# Patient Record
Sex: MALE | Race: BLACK OR AFRICAN AMERICAN | NOT HISPANIC OR LATINO | Employment: OTHER | URBAN - METROPOLITAN AREA
[De-identification: names, ages, dates, MRNs, and addresses within clinical notes are randomized per-mention and may not be internally consistent; named-entity substitution may affect disease eponyms.]

---

## 2023-10-06 ENCOUNTER — SPECIALTY PHARMACY (OUTPATIENT)
Dept: PHARMACY | Facility: CLINIC | Age: 58
End: 2023-10-06

## 2023-10-06 ENCOUNTER — PHARMACY VISIT (OUTPATIENT)
Dept: PHARMACY | Facility: CLINIC | Age: 58
End: 2023-10-06
Payer: MEDICAID

## 2023-10-06 PROCEDURE — RXMED WILLOW AMBULATORY MEDICATION CHARGE

## 2023-10-06 NOTE — PROGRESS NOTES
Patient address: [address] [place] [id number]  Patient Medications: Lynparza  Medication delivery date: Patient set delivery 10/10 for lynparza. OK with co pay.

## 2023-11-04 ENCOUNTER — SPECIALTY PHARMACY (OUTPATIENT)
Dept: PHARMACY | Facility: CLINIC | Age: 58
End: 2023-11-04

## 2023-11-04 ENCOUNTER — PHARMACY VISIT (OUTPATIENT)
Dept: PHARMACY | Facility: CLINIC | Age: 58
End: 2023-11-04
Payer: MEDICAID

## 2023-11-04 PROCEDURE — RXMED WILLOW AMBULATORY MEDICATION CHARGE

## 2023-11-07 ENCOUNTER — SPECIALTY PHARMACY (OUTPATIENT)
Dept: PHARMACY | Facility: CLINIC | Age: 58
End: 2023-11-07

## 2023-11-21 ENCOUNTER — OFFICE VISIT (OUTPATIENT)
Dept: HEMATOLOGY/ONCOLOGY | Facility: HOSPITAL | Age: 58
End: 2023-11-21
Payer: MEDICAID

## 2023-11-21 ENCOUNTER — APPOINTMENT (OUTPATIENT)
Dept: HEMATOLOGY/ONCOLOGY | Facility: HOSPITAL | Age: 58
End: 2023-11-21

## 2023-11-21 VITALS
TEMPERATURE: 96.6 F | HEART RATE: 106 BPM | WEIGHT: 194.67 LBS | RESPIRATION RATE: 18 BRPM | OXYGEN SATURATION: 100 % | DIASTOLIC BLOOD PRESSURE: 84 MMHG | SYSTOLIC BLOOD PRESSURE: 138 MMHG

## 2023-11-21 DIAGNOSIS — C61 MALIGNANT NEOPLASM OF PROSTATE (MULTI): ICD-10-CM

## 2023-11-21 PROCEDURE — 99215 OFFICE O/P EST HI 40 MIN: CPT | Performed by: STUDENT IN AN ORGANIZED HEALTH CARE EDUCATION/TRAINING PROGRAM

## 2023-11-21 PROCEDURE — 1036F TOBACCO NON-USER: CPT | Performed by: STUDENT IN AN ORGANIZED HEALTH CARE EDUCATION/TRAINING PROGRAM

## 2023-11-21 RX ORDER — RIVAROXABAN 20 MG/1
TABLET, FILM COATED ORAL
COMMUNITY
Start: 2023-10-09

## 2023-11-21 ASSESSMENT — PAIN SCALES - GENERAL: PAINLEVEL: 0-NO PAIN

## 2023-11-21 ASSESSMENT — PATIENT HEALTH QUESTIONNAIRE - PHQ9
1. LITTLE INTEREST OR PLEASURE IN DOING THINGS: NOT AT ALL
SUM OF ALL RESPONSES TO PHQ9 QUESTIONS 1 AND 2: 0
2. FEELING DOWN, DEPRESSED OR HOPELESS: NOT AT ALL

## 2023-11-21 ASSESSMENT — COLUMBIA-SUICIDE SEVERITY RATING SCALE - C-SSRS
1. IN THE PAST MONTH, HAVE YOU WISHED YOU WERE DEAD OR WISHED YOU COULD GO TO SLEEP AND NOT WAKE UP?: NO
2. HAVE YOU ACTUALLY HAD ANY THOUGHTS OF KILLING YOURSELF?: NO
6. HAVE YOU EVER DONE ANYTHING, STARTED TO DO ANYTHING, OR PREPARED TO DO ANYTHING TO END YOUR LIFE?: NO

## 2023-11-21 ASSESSMENT — ENCOUNTER SYMPTOMS: OCCASIONAL FEELINGS OF UNSTEADINESS: 1

## 2023-11-21 NOTE — PROGRESS NOTES
Patient ID: Rocky Chappell is a 58 y.o. male.  Diagnosis: mCRPC BRCA2+  [facility]    HISTORY OF PRESENT ILLNESS:  Oncologic Summary   2019 - prostate cancer. Lost to follow up  12/17/21 - stage 4 disease. Palliative RXT. ADT and docetaxel x 6  04/2022 - enzalutamide  03/2023 - developed CRPC. Resumed docetaxel  8/8/23 - Started olaparib   11/21/23 - still on olaparib with stable PSA     56 yo man known with mCRPC s/p docetaxel and enzalutamide who is progressing on docetaxel rechallenge. here for consideration fo pluvicto.     PSA  11/21/23 - 6.1  8/30/23 - 6.6  8/21/23 - 12.7      Surgical History:  Rocky has no past surgical history on file.    Social History:  Rocky     Family History:  No family history on file.      OBJECTIVE:    VS / Pain:  There were no vitals taken for this visit.  BSA: There is no height or weight on file to calculate BSA.    Performance Status:  ECOG Score: 2- Ambulatory and  capable of all selfcare; unable to carry out work activities.  Up and about > 50% of waking hrs.      Assessment/Plan   I personally saw patient and counselled all visit  on his diagnosis, rome history and coordination of his care.     This is a 58 y.o. male AA man known with mCRPC (BRCA2+) s/p docetaxel and enzalutamide now on olaparib clinically well and persistent PSA response. CPM with local team    Thank you for the opportunity to be involved in the care of Rocky Chappell. Please do not hesitate to reach out with any questions. Thank you.   -------------------------------------------------------------------------------------------------------------------------------  Dae More MD, Msc, FACP  Co-Leader Genitourinary () Disease Team  Director of  Medical Oncology Research Program   [facility]  Associate Professor of Medicine  [facility]  [address], [address]  [place]  [address]  Phone: [phone]  [email or web address]

## 2023-11-21 NOTE — LETTER
November 21, 2023     Patient: Rocky Chappell   YOB: 1965   Date of Visit: 11/21/2023       To Whom It May Concern:    Rocky Chappell was seen in my clinic on 11/21/2023 at 12:00 pm. His daughter was able to bring him to our visit since he requires assistance to get to our site. Please excuse Mr Chappell's daughter for her absence from work on this day to make the appointment.    If you have any questions or concerns, please don't hesitate to call.         Sincerely,         Dae More MD        CC: No Recipients

## 2023-12-06 ENCOUNTER — SPECIALTY PHARMACY (OUTPATIENT)
Dept: PHARMACY | Facility: CLINIC | Age: 58
End: 2023-12-06

## 2023-12-06 PROCEDURE — RXMED WILLOW AMBULATORY MEDICATION CHARGE

## 2023-12-13 ENCOUNTER — PHARMACY VISIT (OUTPATIENT)
Dept: PHARMACY | Facility: CLINIC | Age: 58
End: 2023-12-13
Payer: MEDICAID

## 2024-01-11 ENCOUNTER — SPECIALTY PHARMACY (OUTPATIENT)
Dept: PHARMACY | Facility: CLINIC | Age: 59
End: 2024-01-11

## 2024-01-11 PROCEDURE — RXMED WILLOW AMBULATORY MEDICATION CHARGE

## 2024-01-17 ENCOUNTER — PHARMACY VISIT (OUTPATIENT)
Dept: PHARMACY | Facility: CLINIC | Age: 59
End: 2024-01-17
Payer: MEDICAID

## 2024-01-30 ENCOUNTER — OFFICE VISIT (OUTPATIENT)
Dept: HEMATOLOGY/ONCOLOGY | Facility: HOSPITAL | Age: 59
End: 2024-01-30
Payer: MEDICAID

## 2024-01-30 ENCOUNTER — LAB (OUTPATIENT)
Dept: LAB | Facility: HOSPITAL | Age: 59
End: 2024-01-30
Payer: MEDICAID

## 2024-01-30 VITALS
TEMPERATURE: 95.9 F | BODY MASS INDEX: 30.69 KG/M2 | WEIGHT: 195.5 LBS | DIASTOLIC BLOOD PRESSURE: 85 MMHG | RESPIRATION RATE: 98 BRPM | HEIGHT: 67 IN | HEART RATE: 95 BPM | SYSTOLIC BLOOD PRESSURE: 127 MMHG | OXYGEN SATURATION: 100 %

## 2024-01-30 DIAGNOSIS — C61 MALIGNANT NEOPLASM OF PROSTATE (MULTI): ICD-10-CM

## 2024-01-30 PROCEDURE — 99215 OFFICE O/P EST HI 40 MIN: CPT | Performed by: STUDENT IN AN ORGANIZED HEALTH CARE EDUCATION/TRAINING PROGRAM

## 2024-01-30 PROCEDURE — 1036F TOBACCO NON-USER: CPT | Performed by: STUDENT IN AN ORGANIZED HEALTH CARE EDUCATION/TRAINING PROGRAM

## 2024-01-30 ASSESSMENT — ENCOUNTER SYMPTOMS
OCCASIONAL FEELINGS OF UNSTEADINESS: 1
DEPRESSION: 0
LOSS OF SENSATION IN FEET: 1

## 2024-01-30 ASSESSMENT — PAIN SCALES - GENERAL: PAINLEVEL: 0-NO PAIN

## 2024-01-30 NOTE — PROGRESS NOTES
"Patient ID: Rocky Chappell is a 58 y.o. male.  Diagnosis: mCRPC somatic BRCA2+  Referral: [facility]    HISTORY OF PRESENT ILLNESS:  Oncologic Summary   2019 - prostate cancer. Lost to follow up  12/17/21 - stage 4 disease. Palliative RXT. ADT and docetaxel x 6  04/2022 - enzalutamide  03/2023 - developed CRPC. Resumed docetaxel  8/8/23 - Started olaparib   11/21/23 - still on olaparib with stable PSA   1/30/24 - on olaparib. Rising PSA    58 yo man known with mCRPC s/p docetaxel and enzalutamide who is progressing on docetaxel rechallenge. here for consideration fo pluvicto.     PSA  1/11/24 - 119  12/11/23 - 26.2  11/21/23 - 6.1  8/30/23 - 6.6  8/21/23 - 12.7    Surgical History:  Rocky has no past surgical history on file.    Social History:  Rocky reports that he has never smoked. He has never used smokeless tobacco. He reports that he does not drink alcohol and does not use drugs.    Family History:  No family history on file.      OBJECTIVE:    VS / Pain:  /85 (BP Location: Left arm, Patient Position: Sitting, BP Cuff Size: Large adult)   Pulse 95   Temp 35.5 °C (95.9 °F) (Skin)   Resp (!) 98   Ht (S) 1.695 m (5' 6.73\")   Wt 88.7 kg (195 lb 8 oz)   SpO2 100%   BMI 30.87 kg/m²   BSA: 2.04 meters squared    Performance Status:  ECOG Score: 2- Ambulatory and  capable of all selfcare; unable to carry out work activities.  Up and about > 50% of waking hrs.    Assessment/Plan   I personally saw patient and counselled all visit  on his diagnosis, rome history and coordination of his care.   This is a 58 y.o. male AA man known with mCRPC (elena BRCA2+) s/p docetaxel and enzalutamide now on olaparib clinically now with PSA progression. Will get him PSMA PET scan to assess if progression and if pluvicto would be option based on PSMA+ vs possible PSMA neg lesions. Also G360 today   Thank you for the opportunity to be involved in the care of Rocky Chappell. Please do not hesitate to reach out with any " questions. Thank you.   -------------------------------------------------------------------------------------------------------------------------------  Dae More MD, Msc, FACP  Co-Leader Genitourinary () Disease Team  Director of  Medical Oncology Research Program   [facility]  Associate Professor of Medicine  [address], [address]  [place] [address]  Phone: [phone]  [email or web address]

## 2024-01-31 ENCOUNTER — APPOINTMENT (OUTPATIENT)
Dept: RADIOLOGY | Facility: HOSPITAL | Age: 59
End: 2024-01-31
Payer: MEDICAID

## 2024-02-01 ENCOUNTER — HOSPITAL ENCOUNTER (OUTPATIENT)
Dept: RADIOLOGY | Facility: HOSPITAL | Age: 59
Discharge: HOME | End: 2024-02-01
Payer: MEDICAID

## 2024-02-01 DIAGNOSIS — C61 MALIGNANT NEOPLASM OF PROSTATE (MULTI): ICD-10-CM

## 2024-02-01 PROCEDURE — 78815 PET IMAGE W/CT SKULL-THIGH: CPT | Mod: PET TUMOR SUBSQ TX STRATEGY | Performed by: STUDENT IN AN ORGANIZED HEALTH CARE EDUCATION/TRAINING PROGRAM

## 2024-02-01 PROCEDURE — A9800 HC RX 343 DIAGNOSTIC RADIOPHARMACEUTICALS: HCPCS | Mod: SE | Performed by: STUDENT IN AN ORGANIZED HEALTH CARE EDUCATION/TRAINING PROGRAM

## 2024-02-01 PROCEDURE — 78815 PET IMAGE W/CT SKULL-THIGH: CPT | Mod: PS

## 2024-02-01 PROCEDURE — 3430000001 HC RX 343 DIAGNOSTIC RADIOPHARMACEUTICALS: Mod: SE | Performed by: STUDENT IN AN ORGANIZED HEALTH CARE EDUCATION/TRAINING PROGRAM

## 2024-02-01 RX ADMIN — KIT FOR THE PREPARATION OF GALLIUM GA 68 GOZETOTIDE 5.58 MILLICURIE: 25 INJECTION, POWDER, LYOPHILIZED, FOR SOLUTION INTRAVENOUS at 10:09

## 2024-02-09 RX ORDER — OLAPARIB 150 MG/1
300 TABLET, FILM COATED ORAL 2 TIMES DAILY
Qty: 120 TABLET | Refills: 5 | Status: CANCELLED | OUTPATIENT
Start: 2024-02-09 | End: 2025-02-08

## 2024-02-12 NOTE — PROGRESS NOTES
Patient ID: Rocky Chappell is a 58 y.o. male.  Diagnosis: mCRPC somatic BRCA2+  Referral: [facility]    HISTORY OF PRESENT ILLNESS:  Oncologic Summary   2019 - prostate cancer. Lost to follow up  12/17/21 - stage 4 disease. Palliative RXT. ADT and docetaxel x 6  04/2022 - enzalutamide  03/2023 - developed CRPC. Resumed docetaxel  8/8/23 - Started olaparib   11/21/23 - still on olaparib with stable PSA   1/30/24 - on olaparib. Rising PSA  2/12/24 - PD in PSMA PET.     56 yo man known with mCRPC s/p docetaxel and enzalutamide who is progressing on docetaxel rechallenge. here for consideration fo pluvicto.     PSA  1/11/24 - 119  12/11/23 - 26.2  11/21/23 - 6.1  8/30/23 - 6.6  8/21/23 - 12.7    Surgical History:  Rocky has no past surgical history on file.    Social History:  Rocky reports that he has never smoked. He has never used smokeless tobacco. He reports that he does not drink alcohol and does not use drugs.    Family History:  No family history on file.    OBJECTIVE:    VS / Pain:  There were no vitals taken for this visit.  BSA: There is no height or weight on file to calculate BSA.    Performance Status:  ECOG Score: 2- Ambulatory and  capable of all selfcare; unable to carry out work activities.  Up and about > 50% of waking hrs.    Assessment/Plan   I personally saw patient and counselled all visit  on his diagnosis, rome history and coordination of his care.   This is a 58 y.o. male AA man known with mCRPC (elena BRCA2+) s/p docetaxel and enzalutamide now on olaparib clinically now with PSA progression. PSMA confirms extensive disease with visceral mets. In such cases, data suggests pluvicto has more limited activity and PSMA expression seems to be heterogenous. I would favor chemo with platinum-based regimen since he has HRR+ disease. I would consider Carbo-cabazi would be my choice based on phase 2 rand data (Alonso et al, Lancet 2019).   Also G360 pending. Will likely have trials for him  afterwards  Thank you for the opportunity to be involved in the care of Rocky Chappell. Please do not hesitate to reach out with any questions. Thank you.   -------------------------------------------------------------------------------------------------------------------------------  Dae More MD, Msc, FACP  Co-Leader Genitourinary () Disease Team  Director of  Medical Oncology Research Program   [facility]  Associate Professor of Medicine  [address] [address]  [place] [address]  Phone: [phone]  [email or web address]

## 2024-02-13 ENCOUNTER — TELEMEDICINE (OUTPATIENT)
Dept: HEMATOLOGY/ONCOLOGY | Facility: HOSPITAL | Age: 59
End: 2024-02-13
Payer: MEDICAID

## 2024-02-13 DIAGNOSIS — C61 MALIGNANT NEOPLASM OF PROSTATE (MULTI): Primary | ICD-10-CM

## 2024-02-13 PROCEDURE — 99214 OFFICE O/P EST MOD 30 MIN: CPT | Performed by: STUDENT IN AN ORGANIZED HEALTH CARE EDUCATION/TRAINING PROGRAM

## 2024-02-13 PROCEDURE — 1036F TOBACCO NON-USER: CPT | Performed by: STUDENT IN AN ORGANIZED HEALTH CARE EDUCATION/TRAINING PROGRAM

## 2024-02-15 ENCOUNTER — SPECIALTY PHARMACY (OUTPATIENT)
Dept: PHARMACY | Facility: CLINIC | Age: 59
End: 2024-02-15

## 2024-02-15 RX ORDER — OLAPARIB 150 MG/1
TABLET, FILM COATED ORAL
Qty: 240 TABLET | Refills: 5 | Status: CANCELLED | OUTPATIENT
Start: 2024-02-15 | End: 2024-08-17

## 2024-02-22 PROCEDURE — RXMED WILLOW AMBULATORY MEDICATION CHARGE

## 2024-02-26 ENCOUNTER — PHARMACY VISIT (OUTPATIENT)
Dept: PHARMACY | Facility: CLINIC | Age: 59
End: 2024-02-26
Payer: MEDICAID

## 2024-02-27 ENCOUNTER — SPECIALTY PHARMACY (OUTPATIENT)
Dept: PHARMACY | Facility: CLINIC | Age: 59
End: 2024-02-27

## 2024-02-27 NOTE — PROGRESS NOTES
[facility] Clinical Note    Rocky Chappell is a 58 y.o. male, who is on the specialty pharmacy service of: [facility].  Rocky Chappell is taking: Lynparza.     Rocky was contacted on 2/27/2024.    Refer to the encounter summary report for documentation details about patient counseling and education.      Impression/Plan  Is patient high risk? (potential patients:  pregnancy, geriatric, pediatric)   No  Is laboratory follow-up needed? No  Is a clinical intervention needed?  Lynparza discontinued than reordered for not being effective. Reaching out to provider contacts to clarify.  Next assessment date?  3 months  Additional comments:    Medication Adherence  The importance of adherence was discussed with the patient and they were advised to take the medication as prescribed by their provider. Rocky was encouraged to call his physician's office if they have a question regarding a missed dose.     Conclusion  Rate your quality of life on scale of 1-10: 8  Rate your satisfaction with  Specialty Pharmacy on scale of 1-10: 10 - Completely satisfied      Patient advised to contact the pharmacy if there are any changes to her medication list, including prescriptions, OTC medications, herbal products, or supplements. Patient was advised of [facility]’s dispensing process, refill timeline, contact information ([phone]), and patient management follow up. Patient confirmed understanding of education conducted during assessment. All patient questions and concerns were addressed to the best of my ability. Patient was encouraged to contact the specialty pharmacy with any questions or concerns.    Confirmed follow-up outreaches are properly scheduled. Reviewed goals of therapy in the program targets.    Demetrio Salguero, PharmD

## 2024-03-19 ENCOUNTER — APPOINTMENT (OUTPATIENT)
Dept: HEMATOLOGY/ONCOLOGY | Facility: HOSPITAL | Age: 59
End: 2024-03-19
Payer: MEDICAID

## 2024-03-22 LAB
LAB MOLECULAR CA TECHNICAL NOTES: NORMAL
SCAN RESULT: NORMAL

## 2024-03-26 ENCOUNTER — SPECIALTY PHARMACY (OUTPATIENT)
Dept: PHARMACY | Facility: CLINIC | Age: 59
End: 2024-03-26

## 2024-05-14 ENCOUNTER — TELEMEDICINE (OUTPATIENT)
Dept: HEMATOLOGY/ONCOLOGY | Facility: HOSPITAL | Age: 59
End: 2024-05-14
Payer: MEDICARE

## 2024-05-14 DIAGNOSIS — C61 MALIGNANT NEOPLASM OF PROSTATE (MULTI): ICD-10-CM

## 2024-05-14 PROCEDURE — 99214 OFFICE O/P EST MOD 30 MIN: CPT | Performed by: STUDENT IN AN ORGANIZED HEALTH CARE EDUCATION/TRAINING PROGRAM

## 2024-05-14 NOTE — PROGRESS NOTES
Patient ID: Rocky Chappell is a 58 y.o. male.  Diagnosis: mCRPC somatic BRCA2+  Referral: [facility]    HISTORY OF PRESENT ILLNESS:  Oncologic Summary   2019 - prostate cancer. Lost to follow up  12/17/21 - stage 4 disease. Palliative RXT. ADT and docetaxel x 6  04/2022 - enzalutamide  03/2023 - developed CRPC. Resumed docetaxel  8/8/23 - Started olaparib   11/21/23 - still on olaparib with stable PSA   1/30/24 - on olaparib. Rising PSA  2/12/24 - PD in PSMA PET.   03/2024 - carbo/cabazi done locally  05/2024 - completed 3 cycles thus far  --  PSA  5/2/24 - 331  1/11/24 - 119  12/11/23 - 26.2  11/21/23 - 6.1  8/30/23 - 6.6  8/21/23 - 12.7    Surgical History:  Rocky has no past surgical history on file.    Social History:  Rocky reports that he has never smoked. He has never used smokeless tobacco. He reports that he does not drink alcohol and does not use drugs.    Family History:  No family history on file.    OBJECTIVE:    VS / Pain:  There were no vitals taken for this visit.  BSA: There is no height or weight on file to calculate BSA.    Performance Status:  ECOG Score: 2- Ambulatory and  capable of all selfcare; unable to carry out work activities.  Up and about > 50% of waking hrs.    Assessment/Plan   I personally saw patient and counselled all visit  on his diagnosis, rome history and coordination of his care.   This is a 58 y.o. male AA man known with mCRPC (elena BRCA2+) s/p docetaxel and enzalutamide now on olaparib clinically now with PSA progression. PSMA confirms extensive disease with visceral mets. In such cases, data suggests pluvicto has more limited activity and PSMA expression seems to be heterogenous. I would favor chemo with platinum-based regimen since he has HRR+ disease. He has been on carbo/cabazi 3 cycles with tumor control with dose reduction s/t AEs. Will continue on chemo and we will chat in few months.   Thank you for the opportunity to be involved in the care of Rocky Chappell.  Please do not hesitate to reach out with any questions. Thank you.     -------------------------------------------------------------------------------------------------------------------------------  Dae More MD, Msc, FACP  Co-Leader Genitourinary () Disease Team  Director of  Medical Oncology Research Program   [facility]  Associate Professor of Medicine  [address]  [place] [address]  Phone: [phone]  [email or web address]

## 2024-08-20 ENCOUNTER — TELEMEDICINE (OUTPATIENT)
Dept: HEMATOLOGY/ONCOLOGY | Facility: HOSPITAL | Age: 59
End: 2024-08-20

## 2024-08-20 DIAGNOSIS — C61 MALIGNANT NEOPLASM OF PROSTATE (MULTI): ICD-10-CM
